# Patient Record
Sex: MALE | Race: WHITE
[De-identification: names, ages, dates, MRNs, and addresses within clinical notes are randomized per-mention and may not be internally consistent; named-entity substitution may affect disease eponyms.]

---

## 2018-01-20 ENCOUNTER — HOSPITAL ENCOUNTER (INPATIENT)
Dept: HOSPITAL 59 - ER | Age: 83
LOS: 1 days | Discharge: HOME | DRG: 194 | End: 2018-01-21
Attending: INTERNAL MEDICINE | Admitting: INTERNAL MEDICINE
Payer: MEDICARE

## 2018-01-20 DIAGNOSIS — Z79.01: ICD-10-CM

## 2018-01-20 DIAGNOSIS — J45.901: ICD-10-CM

## 2018-01-20 DIAGNOSIS — E78.00: ICD-10-CM

## 2018-01-20 DIAGNOSIS — I10: ICD-10-CM

## 2018-01-20 DIAGNOSIS — J06.9: ICD-10-CM

## 2018-01-20 DIAGNOSIS — I48.2: ICD-10-CM

## 2018-01-20 DIAGNOSIS — I25.2: ICD-10-CM

## 2018-01-20 DIAGNOSIS — J18.9: Primary | ICD-10-CM

## 2018-01-20 LAB
ANION GAP SERPL CALC-SCNC: 14 MMOL/L (ref 7–16)
BASOPHILS NFR BLD: 1 % (ref 0–6)
BUN SERPL-MCNC: 27 MG/DL (ref 8–23)
CK MB SERPL-MCNC: 1.9 NG/ML (ref ?–6.73)
CO2 SERPL-SCNC: 22 MMOL/L (ref 22–29)
CREAT SERPL-MCNC: 1.6 MG/DL (ref 0.7–1.2)
CREATINE PHOSPHOKINASE: 58 U/L (ref 39–308)
EOSINOPHIL NFR BLD: 1 % (ref 0–6)
ERYTHROCYTE [DISTWIDTH] IN BLOOD BY AUTOMATED COUNT: 14.9 % (ref 11.5–14.5)
EST GLOMERULAR FILTRATION RATE: 44 ML/MIN
GLUCOSE SERPL-MCNC: 191 MG/DL (ref 74–109)
HCT VFR BLD CALC: 40.6 % (ref 42–52)
HGB BLD-MCNC: 13.2 GM/DL (ref 14–18)
LYMPHOCYTES NFR BLD: 8 % (ref 16–45)
MCH RBC QN AUTO: 29.2 PG (ref 27–33)
MCHC RBC AUTO-ENTMCNC: 32.5 G/DL (ref 32–36)
MCV RBC AUTO: 89.8 FL (ref 81–97)
MONOCYTES NFR BLD: 5 % (ref 0–9)
NEUTROPHILS NFR BLD AUTO: 85 % (ref 47–80)
PLATELET # BLD: 219 K/UL (ref 130–400)
PMV BLD AUTO: 11.2 FL (ref 7.4–10.4)
RBC # BLD AUTO: 4.52 M/UL (ref 4.4–5.7)
WBC # BLD AUTO: 9.4 K/UL (ref 4.2–12.2)

## 2018-01-20 PROCEDURE — 94762 N-INVAS EAR/PLS OXIMTRY CONT: CPT

## 2018-01-20 PROCEDURE — 85027 COMPLETE CBC AUTOMATED: CPT

## 2018-01-20 PROCEDURE — 99223 1ST HOSP IP/OBS HIGH 75: CPT

## 2018-01-20 PROCEDURE — 84484 ASSAY OF TROPONIN QUANT: CPT

## 2018-01-20 PROCEDURE — 93010 ELECTROCARDIOGRAM REPORT: CPT

## 2018-01-20 PROCEDURE — 83880 ASSAY OF NATRIURETIC PEPTIDE: CPT

## 2018-01-20 PROCEDURE — 94640 AIRWAY INHALATION TREATMENT: CPT

## 2018-01-20 PROCEDURE — 96375 TX/PRO/DX INJ NEW DRUG ADDON: CPT

## 2018-01-20 PROCEDURE — 96374 THER/PROPH/DIAG INJ IV PUSH: CPT

## 2018-01-20 PROCEDURE — 99239 HOSP IP/OBS DSCHRG MGMT >30: CPT

## 2018-01-20 PROCEDURE — 71046 X-RAY EXAM CHEST 2 VIEWS: CPT

## 2018-01-20 PROCEDURE — 90686 IIV4 VACC NO PRSV 0.5 ML IM: CPT

## 2018-01-20 PROCEDURE — 93041 RHYTHM ECG TRACING: CPT

## 2018-01-20 PROCEDURE — 82553 CREATINE MB FRACTION: CPT

## 2018-01-20 PROCEDURE — 80048 BASIC METABOLIC PNL TOTAL CA: CPT

## 2018-01-20 PROCEDURE — 87400 INFLUENZA A/B EACH AG IA: CPT

## 2018-01-20 PROCEDURE — 80053 COMPREHEN METABOLIC PANEL: CPT

## 2018-01-20 PROCEDURE — 99285 EMERGENCY DEPT VISIT HI MDM: CPT

## 2018-01-20 PROCEDURE — 82550 ASSAY OF CK (CPK): CPT

## 2018-01-20 PROCEDURE — 93005 ELECTROCARDIOGRAM TRACING: CPT

## 2018-01-20 RX ADMIN — HYDROCODONE BITARTRATE AND ACETAMINOPHEN PRN EACH: 5; 325 TABLET ORAL at 15:24

## 2018-01-20 RX ADMIN — DOXYCYCLINE HYCLATE SCH MG: 100 CAPSULE ORAL at 21:50

## 2018-01-20 RX ADMIN — ALBUTEROL SULFATE SCH MG: 2.5 SOLUTION RESPIRATORY (INHALATION) at 17:51

## 2018-01-20 RX ADMIN — DRONEDARONE SCH MG: 400 TABLET, FILM COATED ORAL at 21:50

## 2018-01-20 RX ADMIN — ALBUTEROL SULFATE SCH MG: 2.5 SOLUTION RESPIRATORY (INHALATION) at 13:56

## 2018-01-20 RX ADMIN — ALBUTEROL SULFATE SCH MG: 2.5 SOLUTION RESPIRATORY (INHALATION) at 21:37

## 2018-01-20 RX ADMIN — HYDROCODONE BITARTRATE AND ACETAMINOPHEN PRN EACH: 5; 325 TABLET ORAL at 21:50

## 2018-01-20 NOTE — HISTORY & PHYSICAL
History of Present Illness





- Date of Service


Date of Service for History & Physical: 01/21/18





- History of Present Illness


Admitting Diagnosis: 1. Bilateral Lower Lobe Pneumonia


History of Present Illness: 


Mr. Issa is an 86 y/o male with history of chronic atrial fibrillation and 

asthma who presents with complaint of a one week history of dry persistent 

cough. He says he tried using his albuterol rescue inhaler which did provided 

some relief but symptoms returned shortly thereafter. The patient then 

developed progressive shortness of breath which was not relieved by medication. 

He denies having sick contacts, fever, chills, nausea or vomiting during this 

period. He has infrequent asthma exacerbations none requiring hospitalizations 

in the past. The patient has not had his influenza vaccine this season. 





On initial examination in the ED the patient was stable but had some mild 

desaturation on O2 monitor. CXR reports bilateral lower lobe pneumonia and the 

patient was started on IV steroids, Doxycycline/Ceftriaxone and respiratory 

treatments Q4H. 





Travel Screening





- Travel/Exposure Within Last 30 Days


Have you traveled within the last 30 days?: No





- Travel/Exposure Within Last Year


Have you traveled outside the [place] in the last year?: No





- Travel Symptoms


Symptom Screening: None





Review of Systems


Constitutional: Denies: Chills, Fever


Eyes: Denies: Eye discharge


ENT: Denies: Congestion


Respiratory: Reports: Cough.  Denies: Dyspnea





Past Medical History





- SOCIAL HISTORY


Smoking Status: Never smoker


Alcohol Use: None


Drug Use: None





- RESPIRATORY


Hx Respiratory Disorders: Yes


Hx Asthma: Yes





- CARDIOVASCULAR


Hx Cardio Disorders: Yes


Hx Heart Attack: Yes


Hx Hypertension: Yes


Hx Irregular Heartbeat: Yes


Comment:: stents x7, high cholesterol





- NEURO


Hx Neuro Disorders: No





- GI


Hx GI Disorders: No





- 


Hx Genitourinary Disorders: No





- ENDOCRINE


Hx Endocrine Disorders: No





- MUSCULOSKELETAL


Hx Musculoskeletal Disorders: No





- PSYCH


Hx Psych Problems: No





- HEMATOLOGY/ONCOLOGY


Hx Hematology/Oncology Disorders: No





Family Medical History


Any Significant Family History?: Yes


Hx Cancer: Father


Hx Resp Disorders: Mother





H&P Meds/Allergies





- Allergies


Allergies: 


 Allergies











Allergy/AdvReac Type Severity Reaction Status Date / Time


 


ACE Inhibitors Allergy  HIVES Verified 01/20/18 10:42














- Home Medications


 Home Medications











 Medication  Instructions  Recorded  Confirmed  Last Taken


 


Albuterol Sulfate 0.083% [Neb] 3 ml NEB .EVERY 4-6 HOURS PRN 01/20/18 01/20/18 

Unknown


 


Albuterol Sulfate [Ventolin Hfa] 1 - 2 puff IH .EVERY 4-6 HOURS PRN 01/20/18 01/ 20/18 01/20/18


 


Aspirin Chewable 81 mg PO DAILY 01/20/18 01/20/18 Unknown


 


Atorvastatin Calcium 10 mg PO DAILY 01/20/18 01/20/18 Unknown


 


Clopidogrel Bisulfate [Plavix] 75 mg PO DAILY 01/20/18 01/20/18 Unknown


 


Dronedarone HCl [Multaq] 400 mg PO BID 01/20/18 01/20/18 Unknown


 


Ergocalciferol (Vitamin D2) 50,000 unit PO WEEKLY 01/20/18 01/20/18 Unknown





[Vitamin D2]    


 


Fluticasone/Salmeterol [Advair 1 each IH BID 01/20/18 01/20/18 01/20/18





250-50 Diskus]    


 


Metoprolol Succinate [Toprol Xl] 12.5 mg PO DAILY 01/20/18 01/20/18 Unknown


 


Nitroglycerin [Nitrostat] 0.4 mg SL ASDIR 01/20/18 01/20/18 Unknown


 


Omega-3 Fatty Acids/Fish Oil [Fish 1 each PO DAILY 01/20/18 01/20/18 Unknown





Oil 1,000 mg Capsule]    


 


Rivaroxaban [Xarelto] 15 mg PO DAILY 01/20/18 01/20/18 Unknown


 


Tiotropium Bromide [Spiriva] 18 mcg IH BID 01/20/18 01/20/18 01/20/18


 


Valsartan [Diovan] 80 mg PO DAILY 01/20/18 01/20/18 Unknown














- Active Medications


Active Medications: 


 Current Medications





Acetaminophen (Tylenol 500mg Tab)  500 mg PO Q6H PRN


   PRN Reason: PAIN/TEMP


Albuterol Sulfate ()  2.5 mg INH RESP.Q4H.WA [facility]


Aspirin (Aspirin Chewable)  81 mg PO DAILY [facility]


Clopidogrel Bisulfate (Plavix)  75 mg PO DAILY [facility]


Doxycycline Hyclate (Vibramycin)  100 mg PO BID [facility]


Dronedarone (Multaq)  400 mg PO BID [facility]


Ceftriaxone Sodium 1 gm/ (Sodium Chloride)  100 mls @ 100 mls/hr IVPB Q12H [facility]


   Stop: 01/25/18 13:14


Metoprolol Succinate (Toprol Xl)  12.5 mg PO DAILY [facility]


Nitroglycerin (Nitrostat 0.4mg)  0.4 mg SL ASDIR [facility]


Non-Formulary Medication (Atorvastatin Calcium [Atorvastatin Calcium])  10 mg 

PO DAILY [facility]


Prednisone (Prednisone 20mg)  40 mg PO DAILYWM [facility]


Rivaroxaban (Xarelto)  15 mg PO DAILY [facility]


Fluticasone/Salmeterol (Advair 250/50)   puff INH BID [facility]


Tiotropium Bromide (Spiriva)   cap INH BID [facility]


Valsartan (Diovan)  80 mg PO DAILY [facility]











Physical Exam





- Vital Signs


Vital Signs: 


 Vital Signs - Last 24 Hrs











  Pulse Resp BP Pulse Ox


 


 01/20/18 13:04  97 H  18  125/63  94 L














- General


General Appearance: Alert, Oriented x3, Cooperative, No acute distress


Limitations: No limitations





- Head


Head exam: Atraumatic, Normocephalic, Normal inspection





- Eye


Eye exam: Normal appearance, PERRL





- ENT


Throat exam: Normal inspection.  negative: Tonsillar erythema, Tonsillar exudate





- Neck


Neck exam: Normal inspection, Full ROM.  negative: Lymphadenopathy, Meningismus

, Tenderness





- Respiratory


Respiratory exam: Normal lung sounds bilaterally.  negative: Decreased breath 

sounds, Respiratory distress, Rhonchi, Stridor, Wheezes





- Cardiovascular


Cardiovascular Exam: Regular rate, Normal rhythm, Normal heart sounds


Peripheral Pulses: 2+: Radial (R), Radial (L), Dorsalis Pedis (R), Dorsalis 

Pedis (L)





- GI/Abdominal


GI/Abdominal exam: Soft, Normal bowel sounds.  negative: Tenderness





- Extremities


Extremities exam: Normal inspection, Full ROM, Normal capillary refill.  

negative: Pedal edema, Tenderness





- Neurological


Neurological exam: Alert, Normal gait.  negative: Abnormal gait, Motor sensory 

deficit





Results





- Labs


Result Diagrams: 


 01/21/18 06:05





 01/21/18 06:05





VTE H&P Assessment





- Risk for VTE


Risk for VTE: Yes


Risk Level: Low


Risk Assessment Date: 01/20/18


Risk Assessment Time: 13:59


VTE Orders Placed or Will Be Placed: Yes





Plan





- Inpatient Certification


Inpatient Certification: 


Admit to inpatient care: Based on my medical assessment, after consideration of 

patient's risk factors (age, co-morbidities and patient presenting symptoms and 

acuity), I expect that this patient will remain in the hospital greater than or 

equal to two midnights and that the services needed warrant inpatient care 

because: 





Community Acquired Pneumonia





The patient may reasonably be expected to be discharged or transferred to a 

hospital within 96 hours after admission to [facility] 





I certify that my determination is in accordance with my understanding of 

Medicare requirements for reasonable and necessary inpatient services.





01/20/18 13:32








- Detailed Diagnosis and Plan


(1) Community acquired pneumonia


Current Visit: Yes   Status: Acute   Base Code: J18.9 - PNEUMONIA, UNSPECIFIED 

ORGANISM   Comment: 


- chest xray w/ evidence of bilateral lower lobe pneumonia 


- no fever, white count, confusion, or > RR. CURB-65 - 0 


- oxygen to maintain sats > 92% 


- Rocephin 1gm Q12H IV , Doxycycline 100mg BID PO 


- repat labs in the morning    





(2) Asthma exacerbation, mild


Current Visit: Yes   Status: Acute   Base Code: J45.901 - UNSPECIFIED ASTHMA 

WITH (ACUTE) EXACERBATION   Comment: 


- likely as a result of viral infection 


- respiratory therapy with Albuterol Q4H/Breo Q12H 


- Solumedrol 125mg once, Prednisone 40mg QD, peak flow measurement   





(3) Viral URI with cough


Current Visit: Yes   Status: Acute   Base Code: J06.9 - ACUTE UPPER RESPIRATORY 

INFECTION, UNSPECIFIED; B97.89 - OTH VIRAL AGENTS AS THE CAUSE OF DISEASES 

CLASSD ELSWHR   Comment: 


- order influenza A/B PCR 


- supportive care, tylenol prn for fever    





(4) Chronic atrial fibrillation


Current Visit: Yes   Status: Acute   Base Code: I48.2 - CHRONIC ATRIAL 

FIBRILLATION   Comment: 


 - ECG: RBBB, Qtc prolongation 508 but no acute ST-T wave changes. 


 - continuous cardiac monitoring 


 - resume home medications: Troplol XL 12.5mg QD Multaq 400mg BID, ASA 81, 

Plavix 75mg and anticoagulation with Xarelto.    





(5) Hypertension


Current Visit: Yes   Status: Acute   Base Code: I10 - ESSENTIAL (PRIMARY) 

HYPERTENSION   Comment: 


- resume Valasartan 80mg, Atorvastatin 10mg    





(6) Full code status


Current Visit: Yes   Status: Acute   Base Code: Z78.9 - OTHER SPECIFIED HEALTH 

STATUS

## 2018-01-20 NOTE — EMERGENCY DEPARTMENT RECORD
History of Present Illness





- General


Chief Complaint: Difficulty Breathing


Stated Complaint: JANNET


Time Seen by Provider: 18 10:46


Source: Patient


Mode of Arrival: Ambulatory


Limitations: No limitations





- History of Present Illness


Initial Comments: 


The  patient is here due to developing a coughing fit about 5 hours ago and 

then SOB for about a half hour. He did have a barky cough during the episode 

and did use his home inhallers and felt better. There was no reported CP, back 

pain, or any chest discomfort during the episode but the patient has had a mild 

cold for the last week or so. He denies any recent fevers, chills, or sputum 

production. The patient does have a hx of Asthma but has never been admitted to 

the hospital for it.





MD Complaint: Shortness of breath


Onset/Timin


-: Hour(s)


Improves With: Bronchodilators


Worsens With: Nothing


Known History Of: Asthma


Associated Symptoms: Cough


Treatments Prior to Arrival: None


Treatment Prior to Arrival Comment:: proair, spiriva, advair





- Related Data


Home Oxygen Therapy: No


 Home Medications











 Medication  Instructions  Recorded  Confirmed  Last Taken


 


Albuterol Sulfate 0.083% [Neb] 3 ml NEB .EVERY 4-6 HOURS PRN 18 

Unknown


 


Albuterol Sulfate [Ventolin Hfa] 1 - 2 puff IH .EVERY 4-6 HOURS PRN 18


 


Aspirin Chewable 81 mg PO DAILY 18 Unknown


 


Atorvastatin Calcium 10 mg PO DAILY 18 Unknown


 


Clopidogrel Bisulfate [Plavix] 75 mg PO DAILY 18 Unknown


 


Dronedarone HCl [Multaq] 400 mg PO BID 18 Unknown


 


Ergocalciferol (Vitamin D2) 50,000 unit PO WEEKLY 18 Unknown





[Vitamin D2]    


 


Fluticasone/Salmeterol [Advair 1 each IH BID 18





250-50 Diskus]    


 


Metoprolol Succinate [Toprol Xl] 12.5 mg PO DAILY 18 Unknown


 


Nitroglycerin [Nitrostat] 0.4 mg SL ASDIR 18 Unknown


 


Omega-3 Fatty Acids/Fish Oil [Fish 1 each PO DAILY 18 Unknown





Oil 1,000 mg Capsule]    


 


Rivaroxaban [Xarelto] 15 mg PO DAILY 18 Unknown


 


Tiotropium Bromide [Spiriva] 18 mcg IH BID 18


 


Valsartan [Diovan] 80 mg PO DAILY 18 Unknown











 Allergies











Allergy/AdvReac Type Severity Reaction Status Date / Time


 


ACE Inhibitors Allergy  HIVES Verified 18 10:42














Travel Screening





- Travel/Exposure Within Last 30 Days


Have you traveled within the last 30 days?: No





Review of Systems


Constitutional: Denies: Chills, Fever


Eyes: Denies: Eye discharge


ENT: Denies: Congestion


Respiratory: Reports: Cough.  Denies: Dyspnea





Past Medical History





- SOCIAL HISTORY


Smoking Status: Never smoker


Alcohol Use: None


Drug Use: None





- RESPIRATORY


Hx Respiratory Disorders: Yes


Hx Asthma: Yes





- CARDIOVASCULAR


Hx Cardio Disorders: Yes


Hx Heart Attack: Yes


Hx Hypertension: Yes


Hx Irregular Heartbeat: Yes


Comment:: stents x7, high cholesterol





- NEURO


Hx Neuro Disorders: No





- GI


Hx GI Disorders: No





- 


Hx Genitourinary Disorders: No





- ENDOCRINE


Hx Endocrine Disorders: No





- MUSCULOSKELETAL


Hx Musculoskeletal Disorders: No





- PSYCH


Hx Psych Problems: No





- HEMATOLOGY/ONCOLOGY


Hx Hematology/Oncology Disorders: No





Family Medical History


Any Significant Family History?: Yes


Hx Cancer: Father


Hx Resp Disorders: Mother





Physical Exam





- General


General Appearance: Alert, Oriented x3, Cooperative, No acute distress





- Head


Head exam: Atraumatic, Normocephalic, Normal inspection





- Eye


Eye exam: Normal appearance, PERRL





- ENT


Throat exam: Normal inspection.  negative: Tonsillar erythema, Tonsillar exudate





- Neck


Neck exam: Normal inspection, Full ROM.  negative: Lymphadenopathy, Meningismus

, Tenderness





- Respiratory


Respiratory exam: Normal lung sounds bilaterally.  negative: Decreased breath 

sounds, Respiratory distress, Rhonchi, Stridor, Wheezes





- Cardiovascular


Cardiovascular Exam: Regular rate, Normal rhythm, Normal heart sounds





- GI/Abdominal


GI/Abdominal exam: Soft, Normal bowel sounds.  negative: Tenderness





- Extremities


Extremities exam: Normal inspection, Full ROM, Normal capillary refill.  

negative: Pedal edema, Tenderness





- Neurological


Neurological exam: Alert, Normal gait.  negative: Abnormal gait, Motor sensory 

deficit





Course





 Vital Signs











  18





  10:35 10:54


 


Temperature 98.0 F 


 


Pulse Rate 87 


 


Respiratory 16 





Rate  


 


Blood Pressure 114/53 


 


Pulse Ox 91 L 94 L














- Reevaluation(s)


Reevaluation #1: 


The patient is doing well but his RA biox does decrease to 90 off oxygen. On 

recheck his lungs are clear with no wheezing. Due to the xray demonstrating 

pneumonia I did recommend hospital admission and the patient agrees. I then did 

discuss the case with Dr. Staton and he agrees with the plan.


18 12:44








Medical Decision Making





- Data Complexity


MDM Data: Labs Ordered and/or Reviewed, X-Ray Ordered and/or Reviewed, EKG 

Ordered and/or Reviewed





- Lab Data


Result diagrams: 


 18 10:50





 18 10:50





- EKG Data


-: EKG Interpreted by Me


EKG: No Acute Changes





- Radiology Data


Radiology results: Report reviewed (CXR: CMG with mild infiltrates at the bases.

)





Disposition


Disposition: Admit


Clinical Impression: 


Pneumonia


Qualifiers:


 Pneumonia type: due to unspecified organism Laterality: bilateral Lung location

: lower lobe of lung Qualified Code(s): J18.9 - Pneumonia, unspecified organism





Disposition: Still a Patient at [facility]


Decision to Admit: Admit from ER


Decision to Admit Date: 18


Decision to Admit Time: 12:46


Accepting Physician: Kimberly


Time Discussed w/Accepting Physician: 12:46


Condition: (2) Stable


Time of Disposition: 12:46





Quality





- Quality Measures


Quality Measures: N/A





- Blood Pressure Screening


View Details: Yes


Does Patient Have Any of the Following: No


Blood Pressure Classification: Pre-Hypertensive BP Reading


Systolic Measurement: 125


Diastolic Measurement: 63


Screening for High Blood Pressure: < Pre-Hypertensive BP, F/U Documented > [

]


Pre-Hypertensive Follow-up Interventions: Referral to alternative/primary care 

provider.

## 2018-01-21 LAB
ALBUMIN SERPL-MCNC: 3.2 G/DL (ref 4–5)
ALBUMIN/GLOB SERPL: 1.1 {RATIO} (ref 1.1–1.8)
ALP SERPL-CCNC: 73 U/L (ref 40–129)
ALT SERPL-CCNC: 10 U/L (ref ?–41)
ANION GAP SERPL CALC-SCNC: 14 MMOL/L (ref 7–16)
AST SERPL-CCNC: 11 U/L (ref 10–50)
BASOPHILS NFR BLD: 0 % (ref 0–6)
BILIRUB SERPL-MCNC: 0.2 MG/DL (ref 0.2–1)
BUN SERPL-MCNC: 35 MG/DL (ref 8–23)
CO2 SERPL-SCNC: 20 MMOL/L (ref 22–29)
CREAT SERPL-MCNC: 1.7 MG/DL (ref 0.7–1.2)
EOSINOPHIL NFR BLD: 0 % (ref 0–6)
EOSINOPHIL NFR BLD: 1 % (ref 0–6)
ERYTHROCYTE [DISTWIDTH] IN BLOOD BY AUTOMATED COUNT: 14.9 % (ref 11.5–14.5)
EST GLOMERULAR FILTRATION RATE: 41 ML/MIN
FLUBV AG SPEC QL IA: NEGATIVE
GLOBULIN SER-MCNC: 2.9 GM/DL (ref 1.4–4.8)
GLUCOSE SERPL-MCNC: 178 MG/DL (ref 74–109)
HCT VFR BLD CALC: 39.4 % (ref 42–52)
HGB BLD-MCNC: 13 GM/DL (ref 14–18)
LEAD BLD-MCNC: NEGATIVE UG/DL
LYMPHOCYTES NFR BLD AUTO: 3.8 % (ref 16–45)
LYMPHOCYTES NFR BLD: 5 % (ref 16–45)
MCH RBC QN AUTO: 29.4 PG (ref 27–33)
MCHC RBC AUTO-ENTMCNC: 33 G/DL (ref 32–36)
MCV RBC AUTO: 89.3 FL (ref 81–97)
MONOCYTES NFR BLD: 4 % (ref 0–9)
MONOCYTES NFR BLD: 7.6 % (ref 0–9)
NEUTROPHILS NFR BLD AUTO: 86 % (ref 47–80)
NEUTS BAND NFR BLD: 4 % (ref 0–5)
PLATELET # BLD: 243 K/UL (ref 130–400)
PMV BLD AUTO: 11.2 FL (ref 7.4–10.4)
PROT SERPL-MCNC: 6.1 G/DL (ref 6.6–8.7)
RBC # BLD AUTO: 4.41 M/UL (ref 4.4–5.7)
WBC # BLD AUTO: 21.1 K/UL (ref 4.2–12.2)

## 2018-01-21 RX ADMIN — DRONEDARONE SCH MG: 400 TABLET, FILM COATED ORAL at 11:28

## 2018-01-21 RX ADMIN — ALBUTEROL SULFATE SCH MG: 2.5 SOLUTION RESPIRATORY (INHALATION) at 09:35

## 2018-01-21 RX ADMIN — INFLUENZA VIRUS VACCINE ONE MCG: 15; 15; 15; 15 SUSPENSION INTRAMUSCULAR at 12:32

## 2018-01-21 RX ADMIN — DOXYCYCLINE HYCLATE SCH MG: 100 CAPSULE ORAL at 11:27

## 2018-01-21 RX ADMIN — ALBUTEROL SULFATE SCH MG: 2.5 SOLUTION RESPIRATORY (INHALATION) at 06:10

## 2018-01-21 RX ADMIN — DOXYCYCLINE HYCLATE SCH MG: 100 CAPSULE ORAL at 11:28

## 2018-01-21 RX ADMIN — INFLUENZA VIRUS VACCINE ONE: 15; 15; 15; 15 SUSPENSION INTRAMUSCULAR at 11:44

## 2018-01-21 NOTE — RADIOLOGY REPORT
DATE:  01/20/2018. 



EXAM:  CHEST, TWO VIEWS.



COMPARISON:  None.



HISTORY:  Cough and dyspnea.



TECHNIQUE:  Two views of the chest were obtained.



FINDINGS:  There is mild cardiomegaly.  



There are small bilateral pleural effusions.  Scattered airspace disease, right 
greater than left lung base as well which could relate to atelectasis or 
developing infiltrate.  



There is a compression deformity at T12, age indeterminate; correlate for point 
tenderness.



IMPRESSION:  

CARDIOMEGALY AND SMALL BILATERAL PLEURAL EFFUSIONS.  SCATTERED AIRSPACE DISEASE 
MAY RELATE TO INFECTIOUS INFILTRATES OR EDEMA.  CORRELATE FOR POSSIBLE 
CONGESTIVE HEART FAILURE/FLUID OVERLOAD.



JOB NUMBER:  [id number]
MTDD

## 2018-01-21 NOTE — DISCHARGE SUMMARY
Providers


Discharge Summary Date: 01/21/18


Date of admission: 


01/20/18 13:02





Expected Date of Discharge: 01/21/18


Attending physician: 


Eduin Harris





Primary care physician: 


ORLANDO GLASER D.O.








Physical Exam





- Vital Signs


Vital Signs: 


 Vital Signs - Last 24 Hrs











  Temp Pulse Pulse Pulse Resp BP BP


 


 01/21/18 06:10   100 H    20  


 


 01/21/18 05:00  97.6 F   104 H  104 H  18   96/59


 


 01/20/18 21:39   97 H    16  


 


 01/20/18 21:13  98.0 F   103 H   20   124/69


 


 01/20/18 21:00    103 H   16  


 


 01/20/18 17:53   97 H    16  


 


 01/20/18 14:00       


 


 01/20/18 13:56   98 H    16  


 


 01/20/18 13:44    98 H   24  


 


 01/20/18 13:20  97.6 F    99 H  18   112/67


 


 01/20/18 13:04   97 H    18  125/63 














  Pulse Ox


 


 01/21/18 06:10  92 L


 


 01/21/18 05:00  93 L


 


 01/20/18 21:39  96


 


 01/20/18 21:13  94 L


 


 01/20/18 21:00 


 


 01/20/18 17:53  97


 


 01/20/18 14:00  94 L


 


 01/20/18 13:56  95


 


 01/20/18 13:44 


 


 01/20/18 13:20  94 L


 


 01/20/18 13:04  94 L














- General


General Appearance: Alert, Oriented x3, Cooperative, No acute distress


Limitations: No limitations





- Head


Head exam: Atraumatic, Normocephalic, Normal inspection





- Eye


Eye exam: Normal appearance, PERRL





- ENT


Throat exam: Normal inspection.  negative: Tonsillar erythema, Tonsillar exudate





- Neck


Neck exam: Normal inspection, Full ROM.  negative: Lymphadenopathy, Meningismus

, Tenderness





- Respiratory


Respiratory exam: Normal lung sounds bilaterally.  negative: Decreased breath 

sounds, Respiratory distress, Rhonchi, Stridor, Wheezes





- Cardiovascular


Cardiovascular Exam: Regular rate, Normal rhythm, Normal heart sounds


Peripheral Pulses: 2+: Radial (R), Radial (L), Dorsalis Pedis (R), Dorsalis 

Pedis (L)





- GI/Abdominal


GI/Abdominal exam: Soft, Normal bowel sounds.  negative: Tenderness





- Extremities


Extremities exam: Normal inspection, Full ROM, Normal capillary refill.  

negative: Pedal edema, Tenderness





- Neurological


Neurological exam: Alert, Normal gait.  negative: Abnormal gait, Motor sensory 

deficit





Hospitalization





- Hospitalization


Admission Diagnosis: 1. Bilateral Lower Lobe Pneumonia





- Problem List/Discharge Diagnosis


(1) Community acquired pneumonia


Current Visit: Yes   Status: Acute   Base Code: J18.9 - PNEUMONIA, UNSPECIFIED 

ORGANISM   Comment: 


- chest xray w/ evidence of bilateral lower lobe pneumonia 


- no fever, white count, confusion, or > RR. CURB-65 - 0 


- oxygen to maintain sats > 92% 


- Rocephin 1gm Q12H IV , Doxycycline 100mg BID PO 


- repat labs in the morning    





(2) Asthma exacerbation, mild


Current Visit: Yes   Status: Acute   Base Code: J45.901 - UNSPECIFIED ASTHMA 

WITH (ACUTE) EXACERBATION   Comment: 


- likely as a result of viral infection 


- respiratory therapy with Albuterol Q4H/Breo Q12H 


- Solumedrol 125mg once, Prednisone 40mg QD, peak flow measurement   





(3) Viral URI with cough


Current Visit: Yes   Status: Acute   Base Code: J06.9 - ACUTE UPPER RESPIRATORY 

INFECTION, UNSPECIFIED; B97.89 - OTH VIRAL AGENTS AS THE CAUSE OF DISEASES 

CLASSD ELSWHR   Comment: 


- order influenza A/B PCR 


- supportive care, tylenol prn for fever    





(4) Chronic atrial fibrillation


Current Visit: Yes   Status: Acute   Base Code: I48.2 - CHRONIC ATRIAL 

FIBRILLATION   Comment: 


 - ECG: RBBB, Qtc prolongation 508 but no acute ST-T wave changes. 


 - continuous cardiac monitoring 


 - resume home medications: Troplol XL 12.5mg QD Multaq 400mg BID, ASA 81, 

Plavix 75mg and anticoagulation with Xarelto.    





(5) Hypertension


Current Visit: Yes   Status: Acute   Base Code: I10 - ESSENTIAL (PRIMARY) 

HYPERTENSION   Comment: 


- resume Valasartan 80mg, Atorvastatin 10mg    





(6) Full code status


Current Visit: Yes   Status: Acute   Base Code: Z78.9 - OTHER SPECIFIED HEALTH 

STATUS   





- Hospitalization Course


Hospital Course: 


Mr. Issa is an 86 y/o male with history of chronic atrial fibrillation and 

asthma who presents with complaint of a one week history of dry persistent 

cough. He says he tried using his albuterol rescue inhaler which did provided 

some relief but symptoms returned shortly thereafter. The patient then 

developed progressive shortness of breath which was not relieved by medication. 

He denies having sick contacts, fever, chills, nausea or vomiting during this 

period. He has infrequent asthma exacerbations none requiring hospitalizations 

in the past. The patient has not had his influenza vaccine this season. 





On initial examination in the ED the patient was stable but had some mild 

desaturation on O2 monitor. CXR reports bilateral lower lobe pneumonia and the 

patient was started on IV steroids, Doxycycline/Ceftriaxone and respiratory 

treatments Q4H. 





1/21/18: Patient is much improved and not requiring oxygen supplementation at 

rest or for ambulation. Saturations are maintained > 92% and no home oxygen is 

required. Continuous monitoring does not show any acute changes in cardiac 

rhythm and the patient is stable for discharge. 


Abnormal Labs: 


 Abnormal Lab Results











  01/21/18 01/21/18 Range/Units





  06:05 06:05 


 


WBC  21.1 H*   (4.2-12.2)  K/uL


 


Hgb  13.0 L   (14.0-18.0)  gm/dl


 


Hct  39.4 L   (42.0-52.0)  %


 


RDW  14.9 H   (11.5-14.5)  %


 


MPV  11.2 H   (7.4-10.4)  fl


 


Neutrophils %  86.0 H   (47-80)  %


 


Lymphocytes %  3.8 L   (16-45)  %


 


Lymphocytes  5.0 L   (16-45)  %


 


Carbon Dioxide   20.0 L  (22-29)  mmol/L


 


BUN   35 H  (8-23)  mg/dL


 


Creatinine   1.7 H  (0.7-1.2)  mg/dL


 


Random Glucose   178 H  ()  mg/dL


 


Calcium   8.2 L  (8.8-10.2)  mg/dL


 


Total Protein   6.1 L  (6.6-8.7)  g/dL


 


Albumin   3.2 L  (4.0-5.0)  g/dL











Condition at Discharge: (2) Stable





Discharge Medications





- Discharge Medications


Prescriptions: 


Doxycycline Hyclate [Vibramycin] 100 mg PO BID 5 Days #10 capsule


Prednisone [Prednisone 20Mg] 40 mg PO DAILYWM 3 Days #6 tab


Home Medications: 


 Ambulatory Orders





Albuterol Sulfate 0.083% [Neb] 3 ml NEB .EVERY 4-6 HOURS PRN 01/20/18 [Last 

Taken Unknown]


Albuterol Sulfate [Ventolin Hfa] 1 - 2 puff IH .EVERY 4-6 HOURS PRN 01/20/18 [

Last Taken 01/20/18]


Aspirin Chewable 81 mg PO DAILY 01/20/18 [Last Taken Unknown]


Atorvastatin Calcium 10 mg PO DAILY 01/20/18 [Last Taken Unknown]


Dronedarone HCl [Multaq] 400 mg PO BID 01/20/18 [Last Taken Unknown]


Ergocalciferol (Vitamin D2) [Vitamin D2] 50,000 unit PO WEEKLY 01/20/18 [Last 

Taken Unknown]


Fluticasone/Salmeterol [Advair 250-50 Diskus] 1 each IH BID 01/20/18 [Last 

Taken 01/20/18]


Metoprolol Succinate [Toprol Xl] 12.5 mg PO DAILY 01/20/18 [Last Taken Unknown]


Nitroglycerin [Nitrostat] 0.4 mg SL ASDIR 01/20/18 [Last Taken Unknown]


Omega-3 Fatty Acids/Fish Oil [Fish Oil 1,000 mg Capsule] 1 each PO DAILY 01/20/ 18 [Last Taken Unknown]


Rivaroxaban [Xarelto] 15 mg PO DAILY 01/20/18 [Last Taken Unknown]


Tiotropium Bromide [Spiriva] 18 mcg IH BID 01/20/18 [Last Taken 01/20/18]


Valsartan [Diovan] 80 mg PO DAILY 01/20/18 [Last Taken Unknown]


Albuterol Sulfate 0.083% [Neb] 2.5 mg INH RESP.[id number].WA  nebulization solution 01/ 21/18 [Last Taken Unknown]


Doxycycline Hyclate [Vibramycin] 100 mg PO BID 5 Days #10 capsule 01/21/18 [

Last Taken Unknown]


Prednisone [Prednisone 20Mg] 40 mg PO DAILYWM 3 Days #6 tab 01/21/18 [Last 

Taken Unknown]











Discharge Plan





- Discharge Instructions


Activity at Discharge: Increase Activity as Tolerated


Diet at Discharge: Low Fat, Low Cholesterol


Additional Instructions: 


Patient to be discharged home with self care. 


- flu vaccine before at d/c 


- Home scripts include Prednisone 40mg daily x 3 days, and Doxycycline 100mg 

BID x 5 days. 


- follow up with PCP at earliest opportunity





Quality Measures





- Quality Measures


Quality Measures: Atrial Fibrillation & Atrial Flutter: Chronic Anticoagulation 

Therapy, Advance Directives, Documentation of Current Medications in Medical 

Record, Elder Maltreatment Screen and Follow-Up Plan, Screening for High Blood 

Pressure and F/U Documented





- Current Medications


Quality Measure: Measure #130: Documentation of Current Medications


Documentation of Current Medications: <Current Medications Documented/Reviewed> 

[]





- Blood Pressure Screening


Quality Measure: Screening for High Blood Pressure and Follow-Up Documented


Does Patient Have Any of the Following: Active Dx of HTN


Blood Pressure Classification: Pre-Hypertensive BP Reading


Systolic Measurement: 125


Diastolic Measurement: 63


Screening for High Blood Pressure: Patient Exclusion, Hx of HTN []





- Atrial Fibrillation and Atrial Flutter


Quality Measure: Atrial Fibrillation & Atrial Flutter: Chronic Anticoagulation 

Therapy


Does Patient Have Any of the Following: No


CHADS2 Risk Stratification: Age 75 or Greater, Hypertension


Risk Stratification Summary: One or more high risk factors OR more than one 

moderate risk factor exists. []


Anticoagulation Therapy: <Oral anticoagulant Prescribed> []





- Advance Directives


Quality Measure: Measure #47: Care Plan


Advance Directives Established: No


Advance Directives Information Provided To Patient: No


Advance Directives on File: No


Living Will: Yes


Power of : Yes


Advance Care Planning: <Care Plan/Decision Maker Not Decided; Discussed & 

Documented> [[id number]]





- Elder Abuse Suspicion Index


Screening: Elder Abuse Suspicion Index Screening


Rely on people for bathing, dressing, shopping, banking, etc: No


Prevented from getting food, clothes, medication, etc: No


Forced to sign papers or use money against will: No


Feel afraid, touched in ways not wanted or hurt physically: No


Poor eye contact, withdrawn, malnourished, cuts or bruises: No


Screening Result: Negative result


EASI Reference Information: Ayad WEBER, Anjel C, Opal D, Eliseo BECERRA.Development and validation of a tool to assist physicians identification of 

elder abuse: The Elder Abuse Suspicion  Index (EASI ).  Journal of Elder Abuse 

and Neglect, 2008; 20 (3): 276-300.





- Elder Maltreatment Screen


Quality Measures: Elder Maltreatment Screen and Follow-Up Plan


Elder Maltreatment Screen: <Negative, No Follow-Up Plan Required> []